# Patient Record
(demographics unavailable — no encounter records)

---

## 2024-10-25 NOTE — HISTORY OF PRESENT ILLNESS
[de-identified] : Ms. Fariba Meneses is 39 years old male with manubrium lesion here for hospital follow up  Patient with no reported PMHx, no daily medications, nonsmoker, no alcohol use, accompanied by wife/daughter, presents to [place] ED on 1/4/2024 for evaluation of worsening chest pain for past month with acute worsening over past few days.   CTA A/P No evidence of aortic dissection or aortic aneurysm. 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 Bilateral lower lobe linear and patchy opacities suspicious for infiltrates.  Numerous osteolytic and sclerotic bone lesions compatible with metastasis of uncertain origin. These includes numerous osteolytic sternal and manubrial lesions breaking through the cortex . Recommend PET/CT.  Splenomegaly. Numerous up to 3.4 cm low-density splenic lesions. Follow-up MRI without without contrast is recommended. 3 cm cyst in hepatic segment 7 and subcentimeter low-density liver lesions too small to characterize. Prostatomegaly. Correlation with rectal exam and PSA level is recommended. Mediastinal, retroperitoneal and mesenteric lymphadenopathy. Correlation is recommended to exclude neoplastic etiologies.  1/5/2024 CT Chest LUNGS/AIRWAYS/PLEURA: Small volume secretions in the trachea. No endobronchial lesion. Linear and dependent atelectasis in the lower lobes. Left upper lobe calcified granuloma. No pleural effusion. LYMPH NODES/MEDIASTINUM: Unchanged mildly enlarged mediastinal lymph nodes in the prevascular, periaortic, and aortopulmonary window spaces. Calcified lymph nodes compatible with prior granulomatous disease. HEART/VASCULATURE: Normal heart size. No pericardial effusion. Normal caliber aorta. UPPER ABDOMEN: Hepatic cyst. Multiple hypoattenuating lesions in an enlarged spleen. BONES/SOFT TISSUES: Unchanged multiple lytic and sclerotic bone lesions.  1/7/2024 MRI Abd SPLEEN: Enlarged spanning 14.5 cm. Multiple splenic lesions with nonspecific imaging features, mildly T1 hyperintense, hypoenhancement to normal splenic parenchyma, largest 3.3 x 3.1 cm VESSELS: Right main renal artery saccular aneurysm 1.2 cm (19:35) RETROPERITONEUM/LYMPH NODES: Increased number of small retroperitoneal lymph nodes, some are enlarged, for example: *  Celiac axis 2.4 x 1.5 cm (4:18) *  Aortic bifurcation 2.3 x 1.4 cm (4:3) ABDOMINAL WALL: Within normal limits. BONES: 2 marrow replacing lesions in T10 vertebral body and 1 marrow replacing lesion in L2 vertebral body. IMPRESSION:  Enlarged spleen with multiple indeterminate lesions. Differential possibilities include splenic lymphoma versus metastases including melanoma. Recommend further workup with biopsy or FDG PET/CT. Mild retroperitoneal lymphadenopathy.  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     TB work up was neg Hepatitis neg  Flow cytometry: peripheral blood, flow cytometric immunophenotyping: A small lambda light chain restricted B-cell population is detected that represents approximately 2% of the total analyzed events. The significance of this finding is uncertain. It does not necessarily indicate involvement by a lymphoproliferative disorder.  1/8/2024  WBC 3.00 H/H 10.5/32.7 MCV 80.5    s/p hospitalized at [facility] from 8/12/2024 - 8/15/2024 for pain, hypercalcemia, and fatigue. Given Zometa and biopsy of left inguinal LN positive for B-cell lymphoma in a diffuse pattern    [de-identified] : Patient is here for C4 [facility] (8/29/24 - present)   Nilsa # [id number]  He has left rib pain - 3 /10, started earlier this weekend and not needing any pain  meds No longer having any hip/thigh pain

## 2024-10-25 NOTE — HISTORY OF PRESENT ILLNESS
[de-identified] : Ms. Fariba Meneses is 39 years old male with manubrium lesion here for hospital follow up  Patient with no reported PMHx, no daily medications, nonsmoker, no alcohol use, accompanied by wife/daughter, presents to [place] ED on 1/4/2024 for evaluation of worsening chest pain for past month with acute worsening over past few days.   CTA A/P No evidence of aortic dissection or aortic aneurysm. 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 Bilateral lower lobe linear and patchy opacities suspicious for infiltrates.  Numerous osteolytic and sclerotic bone lesions compatible with metastasis of uncertain origin. These includes numerous osteolytic sternal and manubrial lesions breaking through the cortex . Recommend PET/CT.  Splenomegaly. Numerous up to 3.4 cm low-density splenic lesions. Follow-up MRI without without contrast is recommended. 3 cm cyst in hepatic segment 7 and subcentimeter low-density liver lesions too small to characterize. Prostatomegaly. Correlation with rectal exam and PSA level is recommended. Mediastinal, retroperitoneal and mesenteric lymphadenopathy. Correlation is recommended to exclude neoplastic etiologies.  1/5/2024 CT Chest LUNGS/AIRWAYS/PLEURA: Small volume secretions in the trachea. No endobronchial lesion. Linear and dependent atelectasis in the lower lobes. Left upper lobe calcified granuloma. No pleural effusion. LYMPH NODES/MEDIASTINUM: Unchanged mildly enlarged mediastinal lymph nodes in the prevascular, periaortic, and aortopulmonary window spaces. Calcified lymph nodes compatible with prior granulomatous disease. HEART/VASCULATURE: Normal heart size. No pericardial effusion. Normal caliber aorta. UPPER ABDOMEN: Hepatic cyst. Multiple hypoattenuating lesions in an enlarged spleen. BONES/SOFT TISSUES: Unchanged multiple lytic and sclerotic bone lesions.  1/7/2024 MRI Abd SPLEEN: Enlarged spanning 14.5 cm. Multiple splenic lesions with nonspecific imaging features, mildly T1 hyperintense, hypoenhancement to normal splenic parenchyma, largest 3.3 x 3.1 cm VESSELS: Right main renal artery saccular aneurysm 1.2 cm (19:35) RETROPERITONEUM/LYMPH NODES: Increased number of small retroperitoneal lymph nodes, some are enlarged, for example: *  Celiac axis 2.4 x 1.5 cm (4:18) *  Aortic bifurcation 2.3 x 1.4 cm (4:3) ABDOMINAL WALL: Within normal limits. BONES: 2 marrow replacing lesions in T10 vertebral body and 1 marrow replacing lesion in L2 vertebral body. IMPRESSION:  Enlarged spleen with multiple indeterminate lesions. Differential possibilities include splenic lymphoma versus metastases including melanoma. Recommend further workup with biopsy or FDG PET/CT. Mild retroperitoneal lymphadenopathy.  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     TB work up was neg Hepatitis neg  Flow cytometry: peripheral blood, flow cytometric immunophenotyping: A small lambda light chain restricted B-cell population is detected that represents approximately 2% of the total analyzed events. The significance of this finding is uncertain. It does not necessarily indicate involvement by a lymphoproliferative disorder.  1/8/2024  WBC 3.00 H/H 10.5/32.7 MCV 80.5    s/p hospitalized at [facility] from 8/12/2024 - 8/15/2024 for pain, hypercalcemia, and fatigue. Given Zometa and biopsy of left inguinal LN positive for B-cell lymphoma in a diffuse pattern    [de-identified] : Patient is here for C4 [facility] (8/29/24 - present)   Nilsa # [id number]  He has left rib pain - 3 /10, started earlier this weekend and not needing any pain  meds No longer having any hip/thigh pain

## 2024-10-25 NOTE — ASSESSMENT
[FreeTextEntry1] : ##DLBCL  ( dx - 8/2024) Patient with worsening left hip pain, finally had Pet/CT 7/31/2024 Pet/CT   1. Hypermetabolic lymphadenopathy above and below the diaphragm, numerous osseous mets, enlarged, heterogeneous spleen with numerous hypermetabolic lesions s/p Lopez for bone pain, hypercalcemia and fatigue - s/p Zometa 8/11/2024 and biopsy 8/12/2024 Left inguinal LN biopsy shows B cell lymphoma in a diffuse pattern  s/p mediport placement, TTE (EF 60-65%), and LP (neg)   Patient is here for C4 RCHOP with Neulasta (8/29/24 - present Left rib pain - closely to monitor  and to take Tylenol prn pain.  -Labs are drawn in the office, reviewed, analyzed, and discussed Re-iterated to take Prednisone 100 mg daily x 4 days post treatment.  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  to have Pet/CT prior next treatment. -proceed with treatment.   ## Follicular lymphoma (1/8/2024) Grade 1-2 Found on inguinal lymph node biopsy --CT A/P (1/4/24): Splenomegaly. Numerous up to 3.4 cm low-density splenic lesions. Numerous osteolytic and sclerotic bone lesions compatible with metastasis. Mediastinal, retroperitoneal and mesenteric lymphadenopathy --MRI abdomen (1/2024): Enlarged spleen with multiple indeterminate lesions. Differential possibilities include splenic lymphoma versus metastases including melanoma.  Mild retroperitoneal lymphadenopathy. Indeterminate marrow replacing lesions in T10 and L2 vertebral bodies. Hepatitis B, hepatitis C, HIV panels negative --3/13/2024 bone marrow -monotypic b cells (0.43% of total, 4.2% of lymphocytes) in a background of polytpic B cells -myeloblasts (0.44%) ABNORMAL FISH LYMPHOMA PANEL - IGH::BCL2 fusion detected (3%)  #Pain  Referral to palliative care team and rad onc for pain management.   Patient had multiple questions which were answered to satisfaction  d/w Dr. Hilton harrison in 3 weeks for C5 CBC, CMP, LDH,

## 2024-11-15 NOTE — ASSESSMENT
[FreeTextEntry1] : ##DLBCL  ( dx - 8/2024) Patient with worsening left hip pain, finally had Pet/CT 7/31/2024 Pet/CT   1. Hypermetabolic lymphadenopathy above and below the diaphragm, numerous osseous mets, enlarged, heterogeneous spleen with numerous hypermetabolic lesions s/p Lopez for bone pain, hypercalcemia and fatigue - s/p Zometa 8/11/2024 and biopsy 8/12/2024 Left inguinal LN biopsy shows B cell lymphoma in a diffuse pattern  s/p mediport placement, TTE (EF 60-65%), and LP (neg)   Patient is here for C5 RCHOP with Neulasta (8/29/24 - present). Hold due to URTI Symptoms as outlined in HPI Recommend levofloxacin 500 mg daily x 7 days.  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  Restaging PET shows CR  Patient ecstatic to hear the results. Complete 6 cycles of RCHOP followed by repeat scan   Uptake in the prostate Incidentally found on PET He denies any dysuria, polyuria, hematuria He is getting levofloxacin for URTI which should help  ## Follicular lymphoma (1/8/2024) Grade 1-2 Found on inguinal lymph node biopsy --CT A/P (1/4/24): Splenomegaly. Numerous up to 3.4 cm low-density splenic lesions. Numerous osteolytic and sclerotic bone lesions compatible with metastasis. Mediastinal, retroperitoneal and mesenteric lymphadenopathy --MRI abdomen (1/2024): Enlarged spleen with multiple indeterminate lesions. Differential possibilities include splenic lymphoma versus metastases including melanoma.  Mild retroperitoneal lymphadenopathy. Indeterminate marrow replacing lesions in T10 and L2 vertebral bodies. Hepatitis B, hepatitis C, HIV panels negative --3/13/2024 bone marrow -monotypic b cells (0.43% of total, 4.2% of lymphocytes) in a background of polytpic B cells -myeloblasts (0.44%) ABNORMAL FISH LYMPHOMA PANEL - IGH::BCL2 fusion detected (3%)  #Pain  Referral to palliative care team and rad onc for pain management.   Patient had multiple questions which were answered to satisfaction  rtc in 1-2 weeks for C5 CBC, CMP, LDH

## 2024-11-15 NOTE — HISTORY OF PRESENT ILLNESS
[de-identified] : Ms. Fariba Meneses is 39 years old male with manubrium lesion here for hospital follow up  Patient with no reported PMHx, no daily medications, nonsmoker, no alcohol use, accompanied by wife/daughter, presents to [place] ED on 1/4/2024 for evaluation of worsening chest pain for past month with acute worsening over past few days.   CTA A/P No evidence of aortic dissection or aortic aneurysm. 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 Bilateral lower lobe linear and patchy opacities suspicious for infiltrates.  Numerous osteolytic and sclerotic bone lesions compatible with metastasis of uncertain origin. These includes numerous osteolytic sternal and manubrial lesions breaking through the cortex . Recommend PET/CT.  Splenomegaly. Numerous up to 3.4 cm low-density splenic lesions. Follow-up MRI without without contrast is recommended. 3 cm cyst in hepatic segment 7 and subcentimeter low-density liver lesions too small to characterize. Prostatomegaly. Correlation with rectal exam and PSA level is recommended. Mediastinal, retroperitoneal and mesenteric lymphadenopathy. Correlation is recommended to exclude neoplastic etiologies.  1/5/2024 CT Chest LUNGS/AIRWAYS/PLEURA: Small volume secretions in the trachea. No endobronchial lesion. Linear and dependent atelectasis in the lower lobes. Left upper lobe calcified granuloma. No pleural effusion. LYMPH NODES/MEDIASTINUM: Unchanged mildly enlarged mediastinal lymph nodes in the prevascular, periaortic, and aortopulmonary window spaces. Calcified lymph nodes compatible with prior granulomatous disease. HEART/VASCULATURE: Normal heart size. No pericardial effusion. Normal caliber aorta. UPPER ABDOMEN: Hepatic cyst. Multiple hypoattenuating lesions in an enlarged spleen. BONES/SOFT TISSUES: Unchanged multiple lytic and sclerotic bone lesions.  1/7/2024 MRI Abd SPLEEN: Enlarged spanning 14.5 cm. Multiple splenic lesions with nonspecific imaging features, mildly T1 hyperintense, hypoenhancement to normal splenic parenchyma, largest 3.3 x 3.1 cm VESSELS: Right main renal artery saccular aneurysm 1.2 cm (19:35) RETROPERITONEUM/LYMPH NODES: Increased number of small retroperitoneal lymph nodes, some are enlarged, for example: *  Celiac axis 2.4 x 1.5 cm (4:18) *  Aortic bifurcation 2.3 x 1.4 cm (4:3) ABDOMINAL WALL: Within normal limits. BONES: 2 marrow replacing lesions in T10 vertebral body and 1 marrow replacing lesion in L2 vertebral body. IMPRESSION:  Enlarged spleen with multiple indeterminate lesions. Differential possibilities include splenic lymphoma versus metastases including melanoma. Recommend further workup with biopsy or FDG PET/CT. Mild retroperitoneal lymphadenopathy.  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     TB work up was neg Hepatitis neg  Flow cytometry: peripheral blood, flow cytometric immunophenotyping: A small lambda light chain restricted B-cell population is detected that represents approximately 2% of the total analyzed events. The significance of this finding is uncertain. It does not necessarily indicate involvement by a lymphoproliferative disorder.  1/8/2024  WBC 3.00 H/H 10.5/32.7 MCV 80.5    s/p hospitalized at [facility] from 8/12/2024 - 8/15/2024 for pain, hypercalcemia, and fatigue. Given Zometa and biopsy of left inguinal LN positive for B-cell lymphoma in a diffuse pattern    [de-identified] : Patient is here for C5 [facility] (8/29/24 - present). Hold chemo due to URTI  Pranav: [id number]  Patient today feels sick and has symptoms of an upper respiratory infection.  SX: cough with yellow expectoration Also has night sweats with fevers, chills.   PET SCAN 11/12/2024:   1. No definite evidence of FDG avid malignancy, particularly lymphoma, consistent with a complete or near complete response to treatment. Previously noted hypermetabolic lymphadenopathy is no longer identified. Mild uptake is noted at an old fracture deformity in the proximal left clavicle. Mild uptake in the remaining osseous lesions noted previously may be physiologic. Interval resolution of previously noted splenic lesions. Continued surveillance is recommended.  2. New multifocal uptake in the prostate, possibly representing prostatitis. Correlate clinically. Consider correlation with PSA level.  3. Sinusitis at the left maxillary sinus.

## 2024-11-15 NOTE — HISTORY OF PRESENT ILLNESS
[de-identified] : Ms. Fariba Meneses is 39 years old male with manubrium lesion here for hospital follow up  Patient with no reported PMHx, no daily medications, nonsmoker, no alcohol use, accompanied by wife/daughter, presents to [place] ED on 1/4/2024 for evaluation of worsening chest pain for past month with acute worsening over past few days.   CTA A/P No evidence of aortic dissection or aortic aneurysm. 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 Bilateral lower lobe linear and patchy opacities suspicious for infiltrates.  Numerous osteolytic and sclerotic bone lesions compatible with metastasis of uncertain origin. These includes numerous osteolytic sternal and manubrial lesions breaking through the cortex . Recommend PET/CT.  Splenomegaly. Numerous up to 3.4 cm low-density splenic lesions. Follow-up MRI without without contrast is recommended. 3 cm cyst in hepatic segment 7 and subcentimeter low-density liver lesions too small to characterize. Prostatomegaly. Correlation with rectal exam and PSA level is recommended. Mediastinal, retroperitoneal and mesenteric lymphadenopathy. Correlation is recommended to exclude neoplastic etiologies.  1/5/2024 CT Chest LUNGS/AIRWAYS/PLEURA: Small volume secretions in the trachea. No endobronchial lesion. Linear and dependent atelectasis in the lower lobes. Left upper lobe calcified granuloma. No pleural effusion. LYMPH NODES/MEDIASTINUM: Unchanged mildly enlarged mediastinal lymph nodes in the prevascular, periaortic, and aortopulmonary window spaces. Calcified lymph nodes compatible with prior granulomatous disease. HEART/VASCULATURE: Normal heart size. No pericardial effusion. Normal caliber aorta. UPPER ABDOMEN: Hepatic cyst. Multiple hypoattenuating lesions in an enlarged spleen. BONES/SOFT TISSUES: Unchanged multiple lytic and sclerotic bone lesions.  1/7/2024 MRI Abd SPLEEN: Enlarged spanning 14.5 cm. Multiple splenic lesions with nonspecific imaging features, mildly T1 hyperintense, hypoenhancement to normal splenic parenchyma, largest 3.3 x 3.1 cm VESSELS: Right main renal artery saccular aneurysm 1.2 cm (19:35) RETROPERITONEUM/LYMPH NODES: Increased number of small retroperitoneal lymph nodes, some are enlarged, for example: *  Celiac axis 2.4 x 1.5 cm (4:18) *  Aortic bifurcation 2.3 x 1.4 cm (4:3) ABDOMINAL WALL: Within normal limits. BONES: 2 marrow replacing lesions in T10 vertebral body and 1 marrow replacing lesion in L2 vertebral body. IMPRESSION:  Enlarged spleen with multiple indeterminate lesions. Differential possibilities include splenic lymphoma versus metastases including melanoma. Recommend further workup with biopsy or FDG PET/CT. Mild retroperitoneal lymphadenopathy.  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     TB work up was neg Hepatitis neg  Flow cytometry: peripheral blood, flow cytometric immunophenotyping: A small lambda light chain restricted B-cell population is detected that represents approximately 2% of the total analyzed events. The significance of this finding is uncertain. It does not necessarily indicate involvement by a lymphoproliferative disorder.  1/8/2024  WBC 3.00 H/H 10.5/32.7 MCV 80.5    s/p hospitalized at [facility] from 8/12/2024 - 8/15/2024 for pain, hypercalcemia, and fatigue. Given Zometa and biopsy of left inguinal LN positive for B-cell lymphoma in a diffuse pattern    [de-identified] : Patient is here for C5 [facility] (8/29/24 - present). Hold chemo due to URTI  Pranav: [phone]  Patient today feels sick and has symptoms of an upper respiratory infection.  SX: cough with yellow expectoration Also has night sweats with fevers, chills.   PET SCAN 11/12/2024:   1. No definite evidence of FDG avid malignancy, particularly lymphoma, consistent with a complete or near complete response to treatment. Previously noted hypermetabolic lymphadenopathy is no longer identified. Mild uptake is noted at an old fracture deformity in the proximal left clavicle. Mild uptake in the remaining osseous lesions noted previously may be physiologic. Interval resolution of previously noted splenic lesions. Continued surveillance is recommended.  2. New multifocal uptake in the prostate, possibly representing prostatitis. Correlate clinically. Consider correlation with PSA level.  3. Sinusitis at the left maxillary sinus.

## 2024-11-15 NOTE — HISTORY OF PRESENT ILLNESS
[de-identified] : Ms. Fariba Meneses is 39 years old male with manubrium lesion here for hospital follow up  Patient with no reported PMHx, no daily medications, nonsmoker, no alcohol use, accompanied by wife/daughter, presents to [place] ED on 1/4/2024 for evaluation of worsening chest pain for past month with acute worsening over past few days.   CTA A/P No evidence of aortic dissection or aortic aneurysm. 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 Bilateral lower lobe linear and patchy opacities suspicious for infiltrates.  Numerous osteolytic and sclerotic bone lesions compatible with metastasis of uncertain origin. These includes numerous osteolytic sternal and manubrial lesions breaking through the cortex . Recommend PET/CT.  Splenomegaly. Numerous up to 3.4 cm low-density splenic lesions. Follow-up MRI without without contrast is recommended. 3 cm cyst in hepatic segment 7 and subcentimeter low-density liver lesions too small to characterize. Prostatomegaly. Correlation with rectal exam and PSA level is recommended. Mediastinal, retroperitoneal and mesenteric lymphadenopathy. Correlation is recommended to exclude neoplastic etiologies.  1/5/2024 CT Chest LUNGS/AIRWAYS/PLEURA: Small volume secretions in the trachea. No endobronchial lesion. Linear and dependent atelectasis in the lower lobes. Left upper lobe calcified granuloma. No pleural effusion. LYMPH NODES/MEDIASTINUM: Unchanged mildly enlarged mediastinal lymph nodes in the prevascular, periaortic, and aortopulmonary window spaces. Calcified lymph nodes compatible with prior granulomatous disease. HEART/VASCULATURE: Normal heart size. No pericardial effusion. Normal caliber aorta. UPPER ABDOMEN: Hepatic cyst. Multiple hypoattenuating lesions in an enlarged spleen. BONES/SOFT TISSUES: Unchanged multiple lytic and sclerotic bone lesions.  1/7/2024 MRI Abd SPLEEN: Enlarged spanning 14.5 cm. Multiple splenic lesions with nonspecific imaging features, mildly T1 hyperintense, hypoenhancement to normal splenic parenchyma, largest 3.3 x 3.1 cm VESSELS: Right main renal artery saccular aneurysm 1.2 cm (19:35) RETROPERITONEUM/LYMPH NODES: Increased number of small retroperitoneal lymph nodes, some are enlarged, for example: *  Celiac axis 2.4 x 1.5 cm (4:18) *  Aortic bifurcation 2.3 x 1.4 cm (4:3) ABDOMINAL WALL: Within normal limits. BONES: 2 marrow replacing lesions in T10 vertebral body and 1 marrow replacing lesion in L2 vertebral body. IMPRESSION:  Enlarged spleen with multiple indeterminate lesions. Differential possibilities include splenic lymphoma versus metastases including melanoma. Recommend further workup with biopsy or FDG PET/CT. Mild retroperitoneal lymphadenopathy.  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     TB work up was neg Hepatitis neg  Flow cytometry: peripheral blood, flow cytometric immunophenotyping: A small lambda light chain restricted B-cell population is detected that represents approximately 2% of the total analyzed events. The significance of this finding is uncertain. It does not necessarily indicate involvement by a lymphoproliferative disorder.  1/8/2024  WBC 3.00 H/H 10.5/32.7 MCV 80.5    s/p hospitalized at [facility] from 8/12/2024 - 8/15/2024 for pain, hypercalcemia, and fatigue. Given Zometa and biopsy of left inguinal LN positive for B-cell lymphoma in a diffuse pattern    [de-identified] : Patient is here for C5 [facility] (8/29/24 - present). Hold chemo due to URTI  Pranav: [id number]  Patient today feels sick and has symptoms of an upper respiratory infection.  SX: cough with yellow expectoration Also has night sweats with fevers, chills.   PET SCAN 11/12/2024:   1. No definite evidence of FDG avid malignancy, particularly lymphoma, consistent with a complete or near complete response to treatment. Previously noted hypermetabolic lymphadenopathy is no longer identified. Mild uptake is noted at an old fracture deformity in the proximal left clavicle. Mild uptake in the remaining osseous lesions noted previously may be physiologic. Interval resolution of previously noted splenic lesions. Continued surveillance is recommended.  2. New multifocal uptake in the prostate, possibly representing prostatitis. Correlate clinically. Consider correlation with PSA level.  3. Sinusitis at the left maxillary sinus.

## 2024-11-22 NOTE — HISTORY OF PRESENT ILLNESS
[de-identified] : Ms. Fariba Meneses is 39 years old male with manubrium lesion here for hospital follow up  Patient with no reported PMHx, no daily medications, nonsmoker, no alcohol use, accompanied by wife/daughter, presents to [place] ED on 1/4/2024 for evaluation of worsening chest pain for past month with acute worsening over past few days.   CTA A/P No evidence of aortic dissection or aortic aneurysm. 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 Bilateral lower lobe linear and patchy opacities suspicious for infiltrates.  Numerous osteolytic and sclerotic bone lesions compatible with metastasis of uncertain origin. These includes numerous osteolytic sternal and manubrial lesions breaking through the cortex . Recommend PET/CT.  Splenomegaly. Numerous up to 3.4 cm low-density splenic lesions. Follow-up MRI without without contrast is recommended. 3 cm cyst in hepatic segment 7 and subcentimeter low-density liver lesions too small to characterize. Prostatomegaly. Correlation with rectal exam and PSA level is recommended. Mediastinal, retroperitoneal and mesenteric lymphadenopathy. Correlation is recommended to exclude neoplastic etiologies.  1/5/2024 CT Chest LUNGS/AIRWAYS/PLEURA: Small volume secretions in the trachea. No endobronchial lesion. Linear and dependent atelectasis in the lower lobes. Left upper lobe calcified granuloma. No pleural effusion. LYMPH NODES/MEDIASTINUM: Unchanged mildly enlarged mediastinal lymph nodes in the prevascular, periaortic, and aortopulmonary window spaces. Calcified lymph nodes compatible with prior granulomatous disease. HEART/VASCULATURE: Normal heart size. No pericardial effusion. Normal caliber aorta. UPPER ABDOMEN: Hepatic cyst. Multiple hypoattenuating lesions in an enlarged spleen. BONES/SOFT TISSUES: Unchanged multiple lytic and sclerotic bone lesions.  1/7/2024 MRI Abd SPLEEN: Enlarged spanning 14.5 cm. Multiple splenic lesions with nonspecific imaging features, mildly T1 hyperintense, hypoenhancement to normal splenic parenchyma, largest 3.3 x 3.1 cm VESSELS: Right main renal artery saccular aneurysm 1.2 cm (19:35) RETROPERITONEUM/LYMPH NODES: Increased number of small retroperitoneal lymph nodes, some are enlarged, for example: *  Celiac axis 2.4 x 1.5 cm (4:18) *  Aortic bifurcation 2.3 x 1.4 cm (4:3) ABDOMINAL WALL: Within normal limits. BONES: 2 marrow replacing lesions in T10 vertebral body and 1 marrow replacing lesion in L2 vertebral body. IMPRESSION:  Enlarged spleen with multiple indeterminate lesions. Differential possibilities include splenic lymphoma versus metastases including melanoma. Recommend further workup with biopsy or FDG PET/CT. Mild retroperitoneal lymphadenopathy.  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     TB work up was neg Hepatitis neg  Flow cytometry: peripheral blood, flow cytometric immunophenotyping: A small lambda light chain restricted B-cell population is detected that represents approximately 2% of the total analyzed events. The significance of this finding is uncertain. It does not necessarily indicate involvement by a lymphoproliferative disorder.  1/8/2024  WBC 3.00 H/H 10.5/32.7 MCV 80.5    s/p hospitalized at [facility] from 8/12/2024 - 8/15/2024 for pain, hypercalcemia, and fatigue. Given Zometa and biopsy of left inguinal LN positive for B-cell lymphoma in a diffuse pattern    [de-identified] : Patient is here for [address] (8/29/24 - present) - treatment were delayed for 2 weeks due to URI symptoms.   Felton # [id number]  He's doing better, URIs symptoms resolved Denies of fever or pain.

## 2024-11-22 NOTE — ASSESSMENT
[FreeTextEntry1] : ##DLBCL  ( dx - 8/2024) Patient with worsening left hip pain, finally had Pet/CT 7/31/2024 Pet/CT   1. Hypermetabolic lymphadenopathy above and below the diaphragm, numerous osseous mets, enlarged, heterogeneous spleen with numerous hypermetabolic lesions s/p Lopez for bone pain, hypercalcemia and fatigue - s/p Zometa 8/11/2024 and biopsy 8/12/2024 Left inguinal LN biopsy shows B cell lymphoma in a diffuse pattern  s/p mediport placement, TTE (EF 60-65%), and LP (neg)   Patient is here for C5 RCHOP with Neulasta (8/29/24 - present) 11/12/2024 Pet/CT - CR  overall doing well and has no complaints -Labs are drawn in the office, reviewed, analyzed, and discussed to continue with treatment and repeat scan after completion of cycle 6  Uptake in the prostate Incidentally found on PET He denies any dysuria, polyuria, hematuria  ## Follicular lymphoma (1/8/2024) Grade 1-2 Found on inguinal lymph node biopsy --CT A/P (1/4/24): Splenomegaly. Numerous up to 3.4 cm low-density splenic lesions. Numerous osteolytic and sclerotic bone lesions compatible with metastasis. Mediastinal, retroperitoneal and mesenteric lymphadenopathy --MRI abdomen (1/2024): Enlarged spleen with multiple indeterminate lesions. Differential possibilities include splenic lymphoma versus metastases including melanoma.  Mild retroperitoneal lymphadenopathy. Indeterminate marrow replacing lesions in T10 and L2 vertebral bodies. Hepatitis B, hepatitis C, HIV panels negative --3/13/2024 bone marrow -monotypic b cells (0.43% of total, 4.2% of lymphocytes) in a background of polytpic B cells -myeloblasts (0.44%) ABNORMAL FISH LYMPHOMA PANEL - IGH::BCL2 fusion detected (3%)  #Pain  Referral to palliative care team and rad onc for pain management.   Patient had multiple questions which were answered to satisfaction  d/w Dr. Hilton harrison in 3 weeks for C6 CBC, CMP, LDH

## 2024-11-22 NOTE — HISTORY OF PRESENT ILLNESS
[de-identified] : Ms. Fariba Meneses is 39 years old male with manubrium lesion here for hospital follow up  Patient with no reported PMHx, no daily medications, nonsmoker, no alcohol use, accompanied by wife/daughter, presents to [place] ED on 1/4/2024 for evaluation of worsening chest pain for past month with acute worsening over past few days.   CTA A/P No evidence of aortic dissection or aortic aneurysm. 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 Bilateral lower lobe linear and patchy opacities suspicious for infiltrates.  Numerous osteolytic and sclerotic bone lesions compatible with metastasis of uncertain origin. These includes numerous osteolytic sternal and manubrial lesions breaking through the cortex . Recommend PET/CT.  Splenomegaly. Numerous up to 3.4 cm low-density splenic lesions. Follow-up MRI without without contrast is recommended. 3 cm cyst in hepatic segment 7 and subcentimeter low-density liver lesions too small to characterize. Prostatomegaly. Correlation with rectal exam and PSA level is recommended. Mediastinal, retroperitoneal and mesenteric lymphadenopathy. Correlation is recommended to exclude neoplastic etiologies.  1/5/2024 CT Chest LUNGS/AIRWAYS/PLEURA: Small volume secretions in the trachea. No endobronchial lesion. Linear and dependent atelectasis in the lower lobes. Left upper lobe calcified granuloma. No pleural effusion. LYMPH NODES/MEDIASTINUM: Unchanged mildly enlarged mediastinal lymph nodes in the prevascular, periaortic, and aortopulmonary window spaces. Calcified lymph nodes compatible with prior granulomatous disease. HEART/VASCULATURE: Normal heart size. No pericardial effusion. Normal caliber aorta. UPPER ABDOMEN: Hepatic cyst. Multiple hypoattenuating lesions in an enlarged spleen. BONES/SOFT TISSUES: Unchanged multiple lytic and sclerotic bone lesions.  1/7/2024 MRI Abd SPLEEN: Enlarged spanning 14.5 cm. Multiple splenic lesions with nonspecific imaging features, mildly T1 hyperintense, hypoenhancement to normal splenic parenchyma, largest 3.3 x 3.1 cm VESSELS: Right main renal artery saccular aneurysm 1.2 cm (19:35) RETROPERITONEUM/LYMPH NODES: Increased number of small retroperitoneal lymph nodes, some are enlarged, for example: *  Celiac axis 2.4 x 1.5 cm (4:18) *  Aortic bifurcation 2.3 x 1.4 cm (4:3) ABDOMINAL WALL: Within normal limits. BONES: 2 marrow replacing lesions in T10 vertebral body and 1 marrow replacing lesion in L2 vertebral body. IMPRESSION:  Enlarged spleen with multiple indeterminate lesions. Differential possibilities include splenic lymphoma versus metastases including melanoma. Recommend further workup with biopsy or FDG PET/CT. Mild retroperitoneal lymphadenopathy.  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     TB work up was neg Hepatitis neg  Flow cytometry: peripheral blood, flow cytometric immunophenotyping: A small lambda light chain restricted B-cell population is detected that represents approximately 2% of the total analyzed events. The significance of this finding is uncertain. It does not necessarily indicate involvement by a lymphoproliferative disorder.  1/8/2024  WBC 3.00 H/H 10.5/32.7 MCV 80.5    s/p hospitalized at [facility] from 8/12/2024 - 8/15/2024 for pain, hypercalcemia, and fatigue. Given Zometa and biopsy of left inguinal LN positive for B-cell lymphoma in a diffuse pattern    [de-identified] : Patient is here for [address] (8/29/24 - present) - treatment were delayed for 2 weeks due to URI symptoms.   Felton # [id number]  He's doing better, URIs symptoms resolved Denies of fever or pain.

## 2024-12-13 NOTE — HISTORY OF PRESENT ILLNESS
[de-identified] : Ms. Fariba Meneses is 39 years old male with manubrium lesion here for hospital follow up  Patient with no reported PMHx, no daily medications, nonsmoker, no alcohol use, accompanied by wife/daughter, presents to [place] ED on 1/4/2024 for evaluation of worsening chest pain for past month with acute worsening over past few days.   CTA A/P No evidence of aortic dissection or aortic aneurysm. 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 Bilateral lower lobe linear and patchy opacities suspicious for infiltrates.  Numerous osteolytic and sclerotic bone lesions compatible with metastasis of uncertain origin. These includes numerous osteolytic sternal and manubrial lesions breaking through the cortex . Recommend PET/CT.  Splenomegaly. Numerous up to 3.4 cm low-density splenic lesions. Follow-up MRI without without contrast is recommended. 3 cm cyst in hepatic segment 7 and subcentimeter low-density liver lesions too small to characterize. Prostatomegaly. Correlation with rectal exam and PSA level is recommended. Mediastinal, retroperitoneal and mesenteric lymphadenopathy. Correlation is recommended to exclude neoplastic etiologies.  1/5/2024 CT Chest LUNGS/AIRWAYS/PLEURA: Small volume secretions in the trachea. No endobronchial lesion. Linear and dependent atelectasis in the lower lobes. Left upper lobe calcified granuloma. No pleural effusion. LYMPH NODES/MEDIASTINUM: Unchanged mildly enlarged mediastinal lymph nodes in the prevascular, periaortic, and aortopulmonary window spaces. Calcified lymph nodes compatible with prior granulomatous disease. HEART/VASCULATURE: Normal heart size. No pericardial effusion. Normal caliber aorta. UPPER ABDOMEN: Hepatic cyst. Multiple hypoattenuating lesions in an enlarged spleen. BONES/SOFT TISSUES: Unchanged multiple lytic and sclerotic bone lesions.  1/7/2024 MRI Abd SPLEEN: Enlarged spanning 14.5 cm. Multiple splenic lesions with nonspecific imaging features, mildly T1 hyperintense, hypoenhancement to normal splenic parenchyma, largest 3.3 x 3.1 cm VESSELS: Right main renal artery saccular aneurysm 1.2 cm (19:35) RETROPERITONEUM/LYMPH NODES: Increased number of small retroperitoneal lymph nodes, some are enlarged, for example: *  Celiac axis 2.4 x 1.5 cm (4:18) *  Aortic bifurcation 2.3 x 1.4 cm (4:3) ABDOMINAL WALL: Within normal limits. BONES: 2 marrow replacing lesions in T10 vertebral body and 1 marrow replacing lesion in L2 vertebral body. IMPRESSION:  Enlarged spleen with multiple indeterminate lesions. Differential possibilities include splenic lymphoma versus metastases including melanoma. Recommend further workup with biopsy or FDG PET/CT. Mild retroperitoneal lymphadenopathy.  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     TB work up was neg Hepatitis neg  Flow cytometry: peripheral blood, flow cytometric immunophenotyping: A small lambda light chain restricted B-cell population is detected that represents approximately 2% of the total analyzed events. The significance of this finding is uncertain. It does not necessarily indicate involvement by a lymphoproliferative disorder.  1/8/2024  WBC 3.00 H/H 10.5/32.7 MCV 80.5    s/p hospitalized at [facility] from 8/12/2024 - 8/15/2024 for pain, hypercalcemia, and fatigue. Given Zometa and biopsy of left inguinal LN positive for B-cell lymphoma in a diffuse pattern    [de-identified] : Patient is here for C6 RCHOP (8/29/24 - present) - treatment were delayed for 2 weeks due to URI symptoms.   Minerva # [id number]  He had mild headaches and SOB  for a week after each treatment and resolved spontaneously. Currently asymptomatic.

## 2024-12-13 NOTE — ASSESSMENT
[FreeTextEntry1] : ##DLBCL  ( dx - 8/2024) Patient with worsening left hip pain, finally had Pet/CT 7/31/2024 Pet/CT   1. Hypermetabolic lymphadenopathy above and below the diaphragm, numerous osseous mets, enlarged, heterogeneous spleen with numerous hypermetabolic lesions s/p Lopez for bone pain, hypercalcemia and fatigue - s/p Zometa 8/11/2024 and biopsy 8/12/2024 Left inguinal LN biopsy shows B cell lymphoma in a diffuse pattern  s/p mediport placement, TTE (EF 60-65%), and LP (neg)   Patient is here for C6 RCHOP with Neulasta (8/29/24 - present) 11/12/2024 Pet/CT - CR  To continue with Prednisone 100 mg daily x 4 days with treatment  Reassured patient post chemo headaches 2/2 from steroids - to take Tylenol prn.  -Labs are drawn in the office, reviewed, analyzed, and discussed to take Zofran prn nausea to continue with treatment and repeat PET/CT  scan in 3-4 weeks  Uptake in the prostate Incidentally found on PET He denies any dysuria, polyuria, hematuria  ## Follicular lymphoma (1/8/2024) Grade 1-2 Found on inguinal lymph node biopsy --CT A/P (1/4/24): Splenomegaly. Numerous up to 3.4 cm low-density splenic lesions. Numerous osteolytic and sclerotic bone lesions compatible with metastasis. Mediastinal, retroperitoneal and mesenteric lymphadenopathy --MRI abdomen (1/2024): Enlarged spleen with multiple indeterminate lesions. Differential possibilities include splenic lymphoma versus metastases including melanoma.  Mild retroperitoneal lymphadenopathy. Indeterminate marrow replacing lesions in T10 and L2 vertebral bodies. Hepatitis B, hepatitis C, HIV panels negative --3/13/2024 bone marrow -monotypic b cells (0.43% of total, 4.2% of lymphocytes) in a background of polytpic B cells -myeloblasts (0.44%) ABNORMAL FISH LYMPHOMA PANEL - IGH::BCL2 fusion detected (3%)  #Pain  Referral to palliative care team and rad onc for pain management.   Patient had multiple questions which were answered to satisfaction  d/w Dr. Canela  rtc in 4-5 weeks for follow up with Pet/CT scan  CBC, CMP, LDH

## 2025-01-03 NOTE — ASSESSMENT
[FreeTextEntry1] : ##DLBCL  ( dx - 8/2024) Patient with worsening left hip pain, finally had Pet/CT 7/31/2024 Pet/CT   1. Hypermetabolic lymphadenopathy above and below the diaphragm, numerous osseous mets, enlarged, heterogeneous spleen with numerous hypermetabolic lesions s/p Lopez for bone pain, hypercalcemia and fatigue - s/p Zometa 8/11/2024 and biopsy 8/12/2024 Left inguinal LN biopsy shows B cell lymphoma in a diffuse pattern  s/p mediport placement, TTE (EF 60-65%), and LP (neg)  11/12/2024 Pet/CT - CR  s/p C6 RCHOP (8/29/24 - 12/13/24)   Patient is seen today for follow up Overall feels well Restaging PET/CT shows CR.  Labs ordered, drawn in the office, reviewed, analyzed and discussed Chemo induced agranulocytosis: monitor for now. No indication for transfusion. MOnitor for fevers. Precuations during the virus season discussed Follow up in 3 months and restaging scan in 6 months  Uptake in the prostate Incidentally found on PET He denies any dysuria, polyuria, hematuria  ## Follicular lymphoma (1/8/2024) Grade 1-2 Found on inguinal lymph node biopsy --CT A/P (1/4/24): Splenomegaly. Numerous up to 3.4 cm low-density splenic lesions. Numerous osteolytic and sclerotic bone lesions compatible with metastasis. Mediastinal, retroperitoneal and mesenteric lymphadenopathy --MRI abdomen (1/2024): Enlarged spleen with multiple indeterminate lesions. Differential possibilities include splenic lymphoma versus metastases including melanoma.  Mild retroperitoneal lymphadenopathy. Indeterminate marrow replacing lesions in T10 and L2 vertebral bodies. Hepatitis B, hepatitis C, HIV panels negative --3/13/2024 bone marrow -monotypic b cells (0.43% of total, 4.2% of lymphocytes) in a background of polytpic B cells -myeloblasts (0.44%) ABNORMAL FISH LYMPHOMA PANEL - IGH::BCL2 fusion detected (3%)  #Pain  Referral to palliative care team and rad onc for pain management.   Patient had multiple questions which were answered to satisfaction  Follow up in 3 months CBC, CMP, LDH

## 2025-01-03 NOTE — BEGINNING OF VISIT
[0] : 2) Feeling down, depressed, or hopeless: Not at all (0) [PHQ-2 Negative] : PHQ-2 Negative [Pain Scale: ___] : On a scale of 1-10, today the patient's pain is a(n) [unfilled]. [Never] : Never [Date Discussed (MM/DD/YY): ___] : Discussed: [unfilled] [Reviewed, no changes] : Reviewed, no changes [[id number]] : 0

## 2025-01-03 NOTE — HISTORY OF PRESENT ILLNESS
[de-identified] : Ms. Fariba Meneses is 39 years old male with manubrium lesion here for hospital follow up  Patient with no reported PMHx, no daily medications, nonsmoker, no alcohol use, accompanied by wife/daughter, presents to [facility] ED on 1/4/2024 for evaluation of worsening chest pain for past month with acute worsening over past few days.   CTA A/P No evidence of aortic dissection or aortic aneurysm. 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 Bilateral lower lobe linear and patchy opacities suspicious for infiltrates.  Numerous osteolytic and sclerotic bone lesions compatible with metastasis of uncertain origin. These includes numerous osteolytic sternal and manubrial lesions breaking through the cortex . Recommend PET/CT.  Splenomegaly. Numerous up to 3.4 cm low-density splenic lesions. Follow-up MRI without without contrast is recommended. 3 cm cyst in hepatic segment 7 and subcentimeter low-density liver lesions too small to characterize. Prostatomegaly. Correlation with rectal exam and PSA level is recommended. Mediastinal, retroperitoneal and mesenteric lymphadenopathy. Correlation is recommended to exclude neoplastic etiologies.  1/5/2024 CT Chest LUNGS/AIRWAYS/PLEURA: Small volume secretions in the trachea. No endobronchial lesion. Linear and dependent atelectasis in the lower lobes. Left upper lobe calcified granuloma. No pleural effusion. LYMPH NODES/MEDIASTINUM: Unchanged mildly enlarged mediastinal lymph nodes in the prevascular, periaortic, and aortopulmonary window spaces. Calcified lymph nodes compatible with prior granulomatous disease. HEART/VASCULATURE: Normal heart size. No pericardial effusion. Normal caliber aorta. UPPER ABDOMEN: Hepatic cyst. Multiple hypoattenuating lesions in an enlarged spleen. BONES/SOFT TISSUES: Unchanged multiple lytic and sclerotic bone lesions.  1/7/2024 MRI Abd SPLEEN: Enlarged spanning 14.5 cm. Multiple splenic lesions with nonspecific imaging features, mildly T1 hyperintense, hypoenhancement to normal splenic parenchyma, largest 3.3 x 3.1 cm VESSELS: Right main renal artery saccular aneurysm 1.2 cm (19:35) RETROPERITONEUM/LYMPH NODES: Increased number of small retroperitoneal lymph nodes, some are enlarged, for example: *  Celiac axis 2.4 x 1.5 cm (4:18) *  Aortic bifurcation 2.3 x 1.4 cm (4:3) ABDOMINAL WALL: Within normal limits. BONES: 2 marrow replacing lesions in T10 vertebral body and 1 marrow replacing lesion in L2 vertebral body. IMPRESSION:  Enlarged spleen with multiple indeterminate lesions. Differential possibilities include splenic lymphoma versus metastases including melanoma. Recommend further workup with biopsy or FDG PET/CT. Mild retroperitoneal lymphadenopathy.  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     TB work up was neg Hepatitis neg  Flow cytometry: peripheral blood, flow cytometric immunophenotyping: A small lambda light chain restricted B-cell population is detected that represents approximately 2% of the total analyzed events. The significance of this finding is uncertain. It does not necessarily indicate involvement by a lymphoproliferative disorder.  1/8/2024  WBC 3.00 H/H 10.5/32.7 MCV 80.5    s/p hospitalized at [facility] from 8/12/2024 - 8/15/2024 for pain, hypercalcemia, and fatigue. Given Zometa and biopsy of left inguinal LN positive for B-cell lymphoma in a diffuse pattern    [de-identified] : Patient is here for follow up s/p C6 RCHOP (8/29/24 - 12/13/24)   Gold: [id number]   Patient states overall feels well with no new issues except for problems with sleeping at night, minor headache.  He had a PET scan on 12/2024:   1.  There has been virtually complete resolution of all of the abnormal activity seen on the patient's initial study.  2.  Focal activity noted in the prostate on the most recent PET scan from 11/12/2024 is no longer present.  Some irregular activity remains with only minimal FDG uptake.  3.  Nonspecific activity is seen in the salivary glands and symmetric activity is seen in the testicles.  There is also diffusely increased bone marrow activity.  These findings were not present on the baseline study but were present on the prior PET scan and are probably related to chemotherapy.

## 2025-01-03 NOTE — REVIEW OF SYSTEMS
[Insomnia] : insomnia [FreeTextEntry2] : 10 point review of systems negative except as outlined in HPI [de-identified] : slight

## 2025-01-03 NOTE — REASON FOR VISIT
[Initial Consultation] : an initial consultation [Follow-Up Visit] : a follow-up [FreeTextEntry2] : manubrium lesion

## 2025-01-03 NOTE — BEGINNING OF VISIT
[0] : 2) Feeling down, depressed, or hopeless: Not at all (0) [PHQ-2 Negative] : PHQ-2 Negative [Pain Scale: ___] : On a scale of 1-10, today the patient's pain is a(n) [unfilled]. [Never] : Never [Date Discussed (MM/DD/YY): ___] : Discussed: [unfilled] [Reviewed, no changes] : Reviewed, no changes [[id number]] : 0

## 2025-01-03 NOTE — BEGINNING OF VISIT
[0] : 2) Feeling down, depressed, or hopeless: Not at all (0) [PHQ-2 Negative] : PHQ-2 Negative [Pain Scale: ___] : On a scale of 1-10, today the patient's pain is a(n) [unfilled]. [Never] : Never [Date Discussed (MM/DD/YY): ___] : Discussed: [unfilled] [Reviewed, no changes] : Reviewed, no changes [[id number]] : 0

## 2025-01-03 NOTE — REVIEW OF SYSTEMS
[Insomnia] : insomnia [FreeTextEntry2] : 10 point review of systems negative except as outlined in HPI [de-identified] : slight

## 2025-01-03 NOTE — HISTORY OF PRESENT ILLNESS
[de-identified] : Ms. Fariba Meneses is 39 years old male with manubrium lesion here for hospital follow up  Patient with no reported PMHx, no daily medications, nonsmoker, no alcohol use, accompanied by wife/daughter, presents to [place] ED on 1/4/2024 for evaluation of worsening chest pain for past month with acute worsening over past few days.   CTA A/P No evidence of aortic dissection or aortic aneurysm. 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 Bilateral lower lobe linear and patchy opacities suspicious for infiltrates.  Numerous osteolytic and sclerotic bone lesions compatible with metastasis of uncertain origin. These includes numerous osteolytic sternal and manubrial lesions breaking through the cortex . Recommend PET/CT.  Splenomegaly. Numerous up to 3.4 cm low-density splenic lesions. Follow-up MRI without without contrast is recommended. 3 cm cyst in hepatic segment 7 and subcentimeter low-density liver lesions too small to characterize. Prostatomegaly. Correlation with rectal exam and PSA level is recommended. Mediastinal, retroperitoneal and mesenteric lymphadenopathy. Correlation is recommended to exclude neoplastic etiologies.  1/5/2024 CT Chest LUNGS/AIRWAYS/PLEURA: Small volume secretions in the trachea. No endobronchial lesion. Linear and dependent atelectasis in the lower lobes. Left upper lobe calcified granuloma. No pleural effusion. LYMPH NODES/MEDIASTINUM: Unchanged mildly enlarged mediastinal lymph nodes in the prevascular, periaortic, and aortopulmonary window spaces. Calcified lymph nodes compatible with prior granulomatous disease. HEART/VASCULATURE: Normal heart size. No pericardial effusion. Normal caliber aorta. UPPER ABDOMEN: Hepatic cyst. Multiple hypoattenuating lesions in an enlarged spleen. BONES/SOFT TISSUES: Unchanged multiple lytic and sclerotic bone lesions.  1/7/2024 MRI Abd SPLEEN: Enlarged spanning 14.5 cm. Multiple splenic lesions with nonspecific imaging features, mildly T1 hyperintense, hypoenhancement to normal splenic parenchyma, largest 3.3 x 3.1 cm VESSELS: Right main renal artery saccular aneurysm 1.2 cm (19:35) RETROPERITONEUM/LYMPH NODES: Increased number of small retroperitoneal lymph nodes, some are enlarged, for example: *  Celiac axis 2.4 x 1.5 cm (4:18) *  Aortic bifurcation 2.3 x 1.4 cm (4:3) ABDOMINAL WALL: Within normal limits. BONES: 2 marrow replacing lesions in T10 vertebral body and 1 marrow replacing lesion in L2 vertebral body. IMPRESSION:  Enlarged spleen with multiple indeterminate lesions. Differential possibilities include splenic lymphoma versus metastases including melanoma. Recommend further workup with biopsy or FDG PET/CT. Mild retroperitoneal lymphadenopathy.  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     TB work up was neg Hepatitis neg  Flow cytometry: peripheral blood, flow cytometric immunophenotyping: A small lambda light chain restricted B-cell population is detected that represents approximately 2% of the total analyzed events. The significance of this finding is uncertain. It does not necessarily indicate involvement by a lymphoproliferative disorder.  1/8/2024  WBC 3.00 H/H 10.5/32.7 MCV 80.5    s/p hospitalized at [facility] [place] from 8/12/2024 - 8/15/2024 for pain, hypercalcemia, and fatigue. Given Zometa and biopsy of left inguinal LN positive for B-cell lymphoma in a diffuse pattern    [de-identified] : Patient is here for follow up s/p C6 RCHOP (8/29/24 - 12/13/24)   Gold: [id number]   Patient states overall feels well with no new issues except for problems with sleeping at night, minor headache.  He had a PET scan on 12/2024:   1.  There has been virtually complete resolution of all of the abnormal activity seen on the patient's initial study.  2.  Focal activity noted in the prostate on the most recent PET scan from 11/12/2024 is no longer present.  Some irregular activity remains with only minimal FDG uptake.  3.  Nonspecific activity is seen in the salivary glands and symmetric activity is seen in the testicles.  There is also diffusely increased bone marrow activity.  These findings were not present on the baseline study but were present on the prior PET scan and are probably related to chemotherapy.

## 2025-01-03 NOTE — HISTORY OF PRESENT ILLNESS
[de-identified] : Ms. Fariba Meneses is 39 years old male with manubrium lesion here for hospital follow up  Patient with no reported PMHx, no daily medications, nonsmoker, no alcohol use, accompanied by wife/daughter, presents to [facility] ED on 1/4/2024 for evaluation of worsening chest pain for past month with acute worsening over past few days.   CTA A/P No evidence of aortic dissection or aortic aneurysm. 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 Bilateral lower lobe linear and patchy opacities suspicious for infiltrates.  Numerous osteolytic and sclerotic bone lesions compatible with metastasis of uncertain origin. These includes numerous osteolytic sternal and manubrial lesions breaking through the cortex . Recommend PET/CT.  Splenomegaly. Numerous up to 3.4 cm low-density splenic lesions. Follow-up MRI without without contrast is recommended. 3 cm cyst in hepatic segment 7 and subcentimeter low-density liver lesions too small to characterize. Prostatomegaly. Correlation with rectal exam and PSA level is recommended. Mediastinal, retroperitoneal and mesenteric lymphadenopathy. Correlation is recommended to exclude neoplastic etiologies.  1/5/2024 CT Chest LUNGS/AIRWAYS/PLEURA: Small volume secretions in the trachea. No endobronchial lesion. Linear and dependent atelectasis in the lower lobes. Left upper lobe calcified granuloma. No pleural effusion. LYMPH NODES/MEDIASTINUM: Unchanged mildly enlarged mediastinal lymph nodes in the prevascular, periaortic, and aortopulmonary window spaces. Calcified lymph nodes compatible with prior granulomatous disease. HEART/VASCULATURE: Normal heart size. No pericardial effusion. Normal caliber aorta. UPPER ABDOMEN: Hepatic cyst. Multiple hypoattenuating lesions in an enlarged spleen. BONES/SOFT TISSUES: Unchanged multiple lytic and sclerotic bone lesions.  1/7/2024 MRI Abd SPLEEN: Enlarged spanning 14.5 cm. Multiple splenic lesions with nonspecific imaging features, mildly T1 hyperintense, hypoenhancement to normal splenic parenchyma, largest 3.3 x 3.1 cm VESSELS: Right main renal artery saccular aneurysm 1.2 cm (19:35) RETROPERITONEUM/LYMPH NODES: Increased number of small retroperitoneal lymph nodes, some are enlarged, for example: *  Celiac axis 2.4 x 1.5 cm (4:18) *  Aortic bifurcation 2.3 x 1.4 cm (4:3) ABDOMINAL WALL: Within normal limits. BONES: 2 marrow replacing lesions in T10 vertebral body and 1 marrow replacing lesion in L2 vertebral body. IMPRESSION:  Enlarged spleen with multiple indeterminate lesions. Differential possibilities include splenic lymphoma versus metastases including melanoma. Recommend further workup with biopsy or FDG PET/CT. Mild retroperitoneal lymphadenopathy.  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     TB work up was neg Hepatitis neg  Flow cytometry: peripheral blood, flow cytometric immunophenotyping: A small lambda light chain restricted B-cell population is detected that represents approximately 2% of the total analyzed events. The significance of this finding is uncertain. It does not necessarily indicate involvement by a lymphoproliferative disorder.  1/8/2024  WBC 3.00 H/H 10.5/32.7 MCV 80.5    s/p hospitalized at [facility] from 8/12/2024 - 8/15/2024 for pain, hypercalcemia, and fatigue. Given Zometa and biopsy of left inguinal LN positive for B-cell lymphoma in a diffuse pattern    [de-identified] : Patient is here for follow up s/p C6 RCHOP (8/29/24 - 12/13/24)   Gold: [id number]   Patient states overall feels well with no new issues except for problems with sleeping at night, minor headache.  He had a PET scan on 12/2024:   1.  There has been virtually complete resolution of all of the abnormal activity seen on the patient's initial study.  2.  Focal activity noted in the prostate on the most recent PET scan from 11/12/2024 is no longer present.  Some irregular activity remains with only minimal FDG uptake.  3.  Nonspecific activity is seen in the salivary glands and symmetric activity is seen in the testicles.  There is also diffusely increased bone marrow activity.  These findings were not present on the baseline study but were present on the prior PET scan and are probably related to chemotherapy.

## 2025-01-03 NOTE — REVIEW OF SYSTEMS
[Insomnia] : insomnia [FreeTextEntry2] : 10 point review of systems negative except as outlined in HPI [de-identified] : slight

## 2025-01-24 NOTE — REVIEW OF SYSTEMS
[Insomnia] : insomnia [FreeTextEntry2] : 10 point review of systems negative except as outlined in HPI [de-identified] : slight

## 2025-01-24 NOTE — ASSESSMENT
[FreeTextEntry1] : ##DLBCL  ( dx - 8/2024) Patient with worsening left hip pain, finally had Pet/CT 7/31/2024 Pet/CT   1. Hypermetabolic lymphadenopathy above and below the diaphragm, numerous osseous mets, enlarged, heterogeneous spleen with numerous hypermetabolic lesions s/p Lopez for bone pain, hypercalcemia and fatigue - s/p Zometa 8/11/2024 and biopsy 8/12/2024 Left inguinal LN biopsy shows B cell lymphoma in a diffuse pattern  s/p mediport placement, TTE (EF 60-65%), and LP (neg)  11/12/2024 Pet/CT - CR  s/p C6 RCHOP (8/29/24 - 12/13/24)  12/23/2024 Pet/CT - CR.  Patient is here for follow up clinically doing well except for yellowish productive cough in the morning. Patient has no fever, SOB, or other symptoms and lungs care clear, advised to decongestant and humidifier for support care. To f/u with his PCP if symptoms worse or new symptoms arise.  Labs ordered, drawn in the office, reviewed, analyzed and discussed Now with hypercalcemia (Ca 10.6) with corrected calcium 9.6 - advised to follow low calcium food and repeat labs in 2 weeks with his PCP or with us.   -Dec 2024 reviewed with patient - no abnormality were seen on his left rib or abdomen that contributing to his discomfort in that area.  Advised to closely monitor and reach out to us if pain worsen.  Follow up in 3 months and restaging scan in 6 months  Uptake in the prostate Incidentally found on PET He denies any dysuria, polyuria, hematuria  ## Follicular lymphoma (1/8/2024) Grade 1-2 Found on inguinal lymph node biopsy --CT A/P (1/4/24): Splenomegaly. Numerous up to 3.4 cm low-density splenic lesions. Numerous osteolytic and sclerotic bone lesions compatible with metastasis. Mediastinal, retroperitoneal and mesenteric lymphadenopathy --MRI abdomen (1/2024): Enlarged spleen with multiple indeterminate lesions. Differential possibilities include splenic lymphoma versus metastases including melanoma.  Mild retroperitoneal lymphadenopathy. Indeterminate marrow replacing lesions in T10 and L2 vertebral bodies. Hepatitis B, hepatitis C, HIV panels negative --3/13/2024 bone marrow -monotypic b cells (0.43% of total, 4.2% of lymphocytes) in a background of polytpic B cells -myeloblasts (0.44%) ABNORMAL FISH LYMPHOMA PANEL - IGH::BCL2 fusion detected (3%)  #Pain  Referral to palliative care team and rad onc for pain management.   Patient had multiple questions which were answered to satisfaction  d/w Dr. Canela  Follow up in 3 months CBC, CMP, LDH

## 2025-01-24 NOTE — HISTORY OF PRESENT ILLNESS
[de-identified] : Ms. Fariba Meneses is 39 years old male with manubrium lesion here for hospital follow up  Patient with no reported PMHx, no daily medications, nonsmoker, no alcohol use, accompanied by wife/daughter, presents to [facility] ED on 1/4/2024 for evaluation of worsening chest pain for past month with acute worsening over past few days.   CTA A/P No evidence of aortic dissection or aortic aneurysm. 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 Bilateral lower lobe linear and patchy opacities suspicious for infiltrates.  Numerous osteolytic and sclerotic bone lesions compatible with metastasis of uncertain origin. These includes numerous osteolytic sternal and manubrial lesions breaking through the cortex . Recommend PET/CT.  Splenomegaly. Numerous up to 3.4 cm low-density splenic lesions. Follow-up MRI without without contrast is recommended. 3 cm cyst in hepatic segment 7 and subcentimeter low-density liver lesions too small to characterize. Prostatomegaly. Correlation with rectal exam and PSA level is recommended. Mediastinal, retroperitoneal and mesenteric lymphadenopathy. Correlation is recommended to exclude neoplastic etiologies.  1/5/2024 CT Chest LUNGS/AIRWAYS/PLEURA: Small volume secretions in the trachea. No endobronchial lesion. Linear and dependent atelectasis in the lower lobes. Left upper lobe calcified granuloma. No pleural effusion. LYMPH NODES/MEDIASTINUM: Unchanged mildly enlarged mediastinal lymph nodes in the prevascular, periaortic, and aortopulmonary window spaces. Calcified lymph nodes compatible with prior granulomatous disease. HEART/VASCULATURE: Normal heart size. No pericardial effusion. Normal caliber aorta. UPPER ABDOMEN: Hepatic cyst. Multiple hypoattenuating lesions in an enlarged spleen. BONES/SOFT TISSUES: Unchanged multiple lytic and sclerotic bone lesions.  1/7/2024 MRI Abd SPLEEN: Enlarged spanning 14.5 cm. Multiple splenic lesions with nonspecific imaging features, mildly T1 hyperintense, hypoenhancement to normal splenic parenchyma, largest 3.3 x 3.1 cm VESSELS: Right main renal artery saccular aneurysm 1.2 cm (19:35) RETROPERITONEUM/LYMPH NODES: Increased number of small retroperitoneal lymph nodes, some are enlarged, for example: *  Celiac axis 2.4 x 1.5 cm (4:18) *  Aortic bifurcation 2.3 x 1.4 cm (4:3) ABDOMINAL WALL: Within normal limits. BONES: 2 marrow replacing lesions in T10 vertebral body and 1 marrow replacing lesion in L2 vertebral body. IMPRESSION:  Enlarged spleen with multiple indeterminate lesions. Differential possibilities include splenic lymphoma versus metastases including melanoma. Recommend further workup with biopsy or FDG PET/CT. Mild retroperitoneal lymphadenopathy.  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     TB work up was neg Hepatitis neg  Flow cytometry: peripheral blood, flow cytometric immunophenotyping: A small lambda light chain restricted B-cell population is detected that represents approximately 2% of the total analyzed events. The significance of this finding is uncertain. It does not necessarily indicate involvement by a lymphoproliferative disorder.  1/8/2024  WBC 3.00 H/H 10.5/32.7 MCV 80.5    s/p hospitalized at [facility] from 8/12/2024 - 8/15/2024 for pain, hypercalcemia, and fatigue. Given Zometa and biopsy of left inguinal LN positive for B-cell lymphoma in a diffuse pattern    [de-identified] : Patient is here for follow up s/p C6 RCHOP (8/29/24 - 12/13/24)   Rosalie [id number]  He's been having flu like symptoms with yellowish productive cough and clear running nose in the last 2 weeks. Denies SOB, fever, chest pressure He has been having left side (rib/abdominal) pain that comes and goes. Currently asymptomatic.  Denies of any B symptoms.

## 2025-01-24 NOTE — REVIEW OF SYSTEMS
[Insomnia] : insomnia [FreeTextEntry2] : 10 point review of systems negative except as outlined in HPI [de-identified] : slight

## 2025-01-24 NOTE — HISTORY OF PRESENT ILLNESS
[de-identified] : Ms. Fariba Meneses is 39 years old male with manubrium lesion here for hospital follow up  Patient with no reported PMHx, no daily medications, nonsmoker, no alcohol use, accompanied by wife/daughter, presents to [facility] ED on 1/4/2024 for evaluation of worsening chest pain for past month with acute worsening over past few days.   CTA A/P No evidence of aortic dissection or aortic aneurysm. 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 Bilateral lower lobe linear and patchy opacities suspicious for infiltrates.  Numerous osteolytic and sclerotic bone lesions compatible with metastasis of uncertain origin. These includes numerous osteolytic sternal and manubrial lesions breaking through the cortex . Recommend PET/CT.  Splenomegaly. Numerous up to 3.4 cm low-density splenic lesions. Follow-up MRI without without contrast is recommended. 3 cm cyst in hepatic segment 7 and subcentimeter low-density liver lesions too small to characterize. Prostatomegaly. Correlation with rectal exam and PSA level is recommended. Mediastinal, retroperitoneal and mesenteric lymphadenopathy. Correlation is recommended to exclude neoplastic etiologies.  1/5/2024 CT Chest LUNGS/AIRWAYS/PLEURA: Small volume secretions in the trachea. No endobronchial lesion. Linear and dependent atelectasis in the lower lobes. Left upper lobe calcified granuloma. No pleural effusion. LYMPH NODES/MEDIASTINUM: Unchanged mildly enlarged mediastinal lymph nodes in the prevascular, periaortic, and aortopulmonary window spaces. Calcified lymph nodes compatible with prior granulomatous disease. HEART/VASCULATURE: Normal heart size. No pericardial effusion. Normal caliber aorta. UPPER ABDOMEN: Hepatic cyst. Multiple hypoattenuating lesions in an enlarged spleen. BONES/SOFT TISSUES: Unchanged multiple lytic and sclerotic bone lesions.  1/7/2024 MRI Abd SPLEEN: Enlarged spanning 14.5 cm. Multiple splenic lesions with nonspecific imaging features, mildly T1 hyperintense, hypoenhancement to normal splenic parenchyma, largest 3.3 x 3.1 cm VESSELS: Right main renal artery saccular aneurysm 1.2 cm (19:35) RETROPERITONEUM/LYMPH NODES: Increased number of small retroperitoneal lymph nodes, some are enlarged, for example: *  Celiac axis 2.4 x 1.5 cm (4:18) *  Aortic bifurcation 2.3 x 1.4 cm (4:3) ABDOMINAL WALL: Within normal limits. BONES: 2 marrow replacing lesions in T10 vertebral body and 1 marrow replacing lesion in L2 vertebral body. IMPRESSION:  Enlarged spleen with multiple indeterminate lesions. Differential possibilities include splenic lymphoma versus metastases including melanoma. Recommend further workup with biopsy or FDG PET/CT. Mild retroperitoneal lymphadenopathy.  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     TB work up was neg Hepatitis neg  Flow cytometry: peripheral blood, flow cytometric immunophenotyping: A small lambda light chain restricted B-cell population is detected that represents approximately 2% of the total analyzed events. The significance of this finding is uncertain. It does not necessarily indicate involvement by a lymphoproliferative disorder.  1/8/2024  WBC 3.00 H/H 10.5/32.7 MCV 80.5    s/p hospitalized at [facility] from 8/12/2024 - 8/15/2024 for pain, hypercalcemia, and fatigue. Given Zometa and biopsy of left inguinal LN positive for B-cell lymphoma in a diffuse pattern    [de-identified] : Patient is here for follow up s/p C6 RCHOP (8/29/24 - 12/13/24)   Rosalie [id number]  He's been having flu like symptoms with yellowish productive cough and clear running nose in the last 2 weeks. Denies SOB, fever, chest pressure He has been having left side (rib/abdominal) pain that comes and goes. Currently asymptomatic.  Denies of any B symptoms.

## 2025-04-25 NOTE — REVIEW OF SYSTEMS
[Insomnia] : insomnia [FreeTextEntry2] : 10 point review of systems negative except as outlined in HPI [de-identified] : slight

## 2025-04-25 NOTE — REVIEW OF SYSTEMS
[Insomnia] : insomnia [FreeTextEntry2] : 10 point review of systems negative except as outlined in HPI [de-identified] : slight Follow up with your primary care doctor.  Take ibuprofen (or Motrin) 600mg (3 tablets) up to 4 times per day as needed for pain with food or milk.   Return to this Emergency Department if you experience worsening condition or for any other emergency.

## 2025-04-25 NOTE — HISTORY OF PRESENT ILLNESS
[de-identified] : Ms. Fariba Meneses is 39 years old male with manubrium lesion here for hospital follow up  Patient with no reported PMHx, no daily medications, nonsmoker, no alcohol use, accompanied by wife/daughter, presents to [place] ED on 1/4/2024 for evaluation of worsening chest pain for past month with acute worsening over past few days.   CTA A/P No evidence of aortic dissection or aortic aneurysm. 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 Bilateral lower lobe linear and patchy opacities suspicious for infiltrates.  Numerous osteolytic and sclerotic bone lesions compatible with metastasis of uncertain origin. These includes numerous osteolytic sternal and manubrial lesions breaking through the cortex . Recommend PET/CT.  Splenomegaly. Numerous up to 3.4 cm low-density splenic lesions. Follow-up MRI without without contrast is recommended. 3 cm cyst in hepatic segment 7 and subcentimeter low-density liver lesions too small to characterize. Prostatomegaly. Correlation with rectal exam and PSA level is recommended. Mediastinal, retroperitoneal and mesenteric lymphadenopathy. Correlation is recommended to exclude neoplastic etiologies.  1/5/2024 CT Chest LUNGS/AIRWAYS/PLEURA: Small volume secretions in the trachea. No endobronchial lesion. Linear and dependent atelectasis in the lower lobes. Left upper lobe calcified granuloma. No pleural effusion. LYMPH NODES/MEDIASTINUM: Unchanged mildly enlarged mediastinal lymph nodes in the prevascular, periaortic, and aortopulmonary window spaces. Calcified lymph nodes compatible with prior granulomatous disease. HEART/VASCULATURE: Normal heart size. No pericardial effusion. Normal caliber aorta. UPPER ABDOMEN: Hepatic cyst. Multiple hypoattenuating lesions in an enlarged spleen. BONES/SOFT TISSUES: Unchanged multiple lytic and sclerotic bone lesions.  1/7/2024 MRI Abd SPLEEN: Enlarged spanning 14.5 cm. Multiple splenic lesions with nonspecific imaging features, mildly T1 hyperintense, hypoenhancement to normal splenic parenchyma, largest 3.3 x 3.1 cm VESSELS: Right main renal artery saccular aneurysm 1.2 cm (19:35) RETROPERITONEUM/LYMPH NODES: Increased number of small retroperitoneal lymph nodes, some are enlarged, for example: *  Celiac axis 2.4 x 1.5 cm (4:18) *  Aortic bifurcation 2.3 x 1.4 cm (4:3) ABDOMINAL WALL: Within normal limits. BONES: 2 marrow replacing lesions in T10 vertebral body and 1 marrow replacing lesion in L2 vertebral body. IMPRESSION:  Enlarged spleen with multiple indeterminate lesions. Differential possibilities include splenic lymphoma versus metastases including melanoma. Recommend further workup with biopsy or FDG PET/CT. Mild retroperitoneal lymphadenopathy.  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     TB work up was neg Hepatitis neg  Flow cytometry: peripheral blood, flow cytometric immunophenotyping: A small lambda light chain restricted B-cell population is detected that represents approximately 2% of the total analyzed events. The significance of this finding is uncertain. It does not necessarily indicate involvement by a lymphoproliferative disorder.  1/8/2024  WBC 3.00 H/H 10.5/32.7 MCV 80.5    s/p hospitalized at [facility] from 8/12/2024 - 8/15/2024 for pain, hypercalcemia, and fatigue. Given Zometa and biopsy of left inguinal LN positive for B-cell lymphoma in a diffuse pattern    [de-identified] : Patient is here for follow up s/p C6 TRACI (8/29/24 - 12/13/24)   Yessi # [id number]  He has intermittent generalized headaches and has itchy eyes, attributing his headaches 2/2 to not sleeping well despite taking all over the counter sleeping aid meds.  Denies of any B symptoms.  He has burning urination here and there quickly resolved. Denies any  symptoms.

## 2025-04-25 NOTE — ASSESSMENT
[FreeTextEntry1] :  ##DLBCL  ( dx - 8/2024) Patient with worsening left hip pain, finally had Pet/CT 7/31/2024 Pet/CT   1. Hypermetabolic lymphadenopathy above and below the diaphragm, numerous osseous mets, enlarged, heterogeneous spleen with numerous hypermetabolic lesions s/p Lopez for bone pain, hypercalcemia and fatigue - s/p Zometa 8/11/2024 and biopsy 8/12/2024 Left inguinal LN biopsy shows B cell lymphoma in a diffuse pattern  s/p mediport placement, TTE (EF 60-65%), and LP (neg)  11/12/2024 Pet/CT - CR  s/p C6 RCHOP (8/29/24 - 12/13/24)  12/23/2024 Pet/CT - CR.  Patient is here for follow up  Denies any B symptoms  Advised that he can take Benadryl qhs to help with sleep but if not improved, he can reach out to us or his PCP to start on Ambien.  Will do UA and culture for dysuria - advised to follow up with urology if symptoms persisted.  -Labs are drawn in the office, reviewed, analyzed, and discussed No palpable LNs To continue to monitor for now and repeat Pet/CT scan in June/July 2025.   Uptake in the prostate Incidentally found on PET to follow up with urologist   ## Follicular lymphoma (1/8/2024) Grade 1-2 Found on inguinal lymph node biopsy --CT A/P (1/4/24): Splenomegaly. Numerous up to 3.4 cm low-density splenic lesions. Numerous osteolytic and sclerotic bone lesions compatible with metastasis. Mediastinal, retroperitoneal and mesenteric lymphadenopathy --MRI abdomen (1/2024): Enlarged spleen with multiple indeterminate lesions. Differential possibilities include splenic lymphoma versus metastases including melanoma.  Mild retroperitoneal lymphadenopathy. Indeterminate marrow replacing lesions in T10 and L2 vertebral bodies. Hepatitis B, hepatitis C, HIV panels negative --3/13/2024 bone marrow -monotypic b cells (0.43% of total, 4.2% of lymphocytes) in a background of polytpic B cells -myeloblasts (0.44%) ABNORMAL FISH LYMPHOMA PANEL - IGH::BCL2 fusion detected (3%)  #Pain  Referral to palliative care team and rad onc for pain management.   Patient had multiple questions which were answered to satisfaction  d/w Dr. Canela  Follow up in 3 months CBC, CMP, LDH, PSA total and free

## 2025-04-25 NOTE — HISTORY OF PRESENT ILLNESS
[de-identified] : Ms. Fariba Meneses is 39 years old male with manubrium lesion here for hospital follow up  Patient with no reported PMHx, no daily medications, nonsmoker, no alcohol use, accompanied by wife/daughter, presents to [place] ED on 1/4/2024 for evaluation of worsening chest pain for past month with acute worsening over past few days.   CTA A/P No evidence of aortic dissection or aortic aneurysm. 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 Bilateral lower lobe linear and patchy opacities suspicious for infiltrates.  Numerous osteolytic and sclerotic bone lesions compatible with metastasis of uncertain origin. These includes numerous osteolytic sternal and manubrial lesions breaking through the cortex . Recommend PET/CT.  Splenomegaly. Numerous up to 3.4 cm low-density splenic lesions. Follow-up MRI without without contrast is recommended. 3 cm cyst in hepatic segment 7 and subcentimeter low-density liver lesions too small to characterize. Prostatomegaly. Correlation with rectal exam and PSA level is recommended. Mediastinal, retroperitoneal and mesenteric lymphadenopathy. Correlation is recommended to exclude neoplastic etiologies.  1/5/2024 CT Chest LUNGS/AIRWAYS/PLEURA: Small volume secretions in the trachea. No endobronchial lesion. Linear and dependent atelectasis in the lower lobes. Left upper lobe calcified granuloma. No pleural effusion. LYMPH NODES/MEDIASTINUM: Unchanged mildly enlarged mediastinal lymph nodes in the prevascular, periaortic, and aortopulmonary window spaces. Calcified lymph nodes compatible with prior granulomatous disease. HEART/VASCULATURE: Normal heart size. No pericardial effusion. Normal caliber aorta. UPPER ABDOMEN: Hepatic cyst. Multiple hypoattenuating lesions in an enlarged spleen. BONES/SOFT TISSUES: Unchanged multiple lytic and sclerotic bone lesions.  1/7/2024 MRI Abd SPLEEN: Enlarged spanning 14.5 cm. Multiple splenic lesions with nonspecific imaging features, mildly T1 hyperintense, hypoenhancement to normal splenic parenchyma, largest 3.3 x 3.1 cm VESSELS: Right main renal artery saccular aneurysm 1.2 cm (19:35) RETROPERITONEUM/LYMPH NODES: Increased number of small retroperitoneal lymph nodes, some are enlarged, for example: *  Celiac axis 2.4 x 1.5 cm (4:18) *  Aortic bifurcation 2.3 x 1.4 cm (4:3) ABDOMINAL WALL: Within normal limits. BONES: 2 marrow replacing lesions in T10 vertebral body and 1 marrow replacing lesion in L2 vertebral body. IMPRESSION:  Enlarged spleen with multiple indeterminate lesions. Differential possibilities include splenic lymphoma versus metastases including melanoma. Recommend further workup with biopsy or FDG PET/CT. Mild retroperitoneal lymphadenopathy.  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     TB work up was neg Hepatitis neg  Flow cytometry: peripheral blood, flow cytometric immunophenotyping: A small lambda light chain restricted B-cell population is detected that represents approximately 2% of the total analyzed events. The significance of this finding is uncertain. It does not necessarily indicate involvement by a lymphoproliferative disorder.  1/8/2024  WBC 3.00 H/H 10.5/32.7 MCV 80.5    s/p hospitalized at [facility] from 8/12/2024 - 8/15/2024 for pain, hypercalcemia, and fatigue. Given Zometa and biopsy of left inguinal LN positive for B-cell lymphoma in a diffuse pattern    [de-identified] : Patient is here for follow up s/p C6 TRACI (8/29/24 - 12/13/24)   Yessi # [id number]  He has intermittent generalized headaches and has itchy eyes, attributing his headaches 2/2 to not sleeping well despite taking all over the counter sleeping aid meds.  Denies of any B symptoms.  He has burning urination here and there quickly resolved. Denies any  symptoms.

## 2025-07-25 NOTE — REVIEW OF SYSTEMS
[Insomnia] : insomnia [FreeTextEntry2] : 10 point review of systems negative except as outlined in HPI [de-identified] : slight

## 2025-07-25 NOTE — ASSESSMENT
[FreeTextEntry1] : ##DLBCL  ( dx - 8/2024) Patient with worsening left hip pain, finally had Pet/CT 7/31/2024 Pet/CT   1. Hypermetabolic lymphadenopathy above and below the diaphragm, numerous osseous mets, enlarged, heterogeneous spleen with numerous hypermetabolic lesions s/p Lopez for bone pain, hypercalcemia and fatigue - s/p Zometa 8/11/2024 and biopsy 8/12/2024 Left inguinal LN biopsy shows B cell lymphoma in a diffuse pattern  s/p mediport placement, TTE (EF 60-65%), and LP (neg)  11/12/2024 Pet/CT - CR  s/p C6 RCHOP (8/29/24 - 12/13/24)  12/23/2024 Pet/CT - CR.  Patient is here for follow up  Denies any B symptoms  -Insomnia -  not improved with Melatonin. Will give script for Ambien 5 mg qhs.  To take Tylenol headaches.  Mediport flushed without any issues - normal examination  No palpable LNs 5/12/2025 Pet/CT scan - Minimally avid of lytic and sclerotic appearance on pelvis, sternum, and thoracic spine. unchanged hilar/subcarinal node. Nonspecific minimal heterogeneous activity in prostate.  --Scan reviewed and discussed  -Labs are drawn in the office, reviewed, analyzed, and discussed Will continue to monitor for now and repeat scan in Dec, prior to his next visit   ## Uptake in the prostate Incidentally found on PET Given referral to see urologist Dr. Lotus Barlow  ## Follicular lymphoma (1/8/2024) Grade 1-2 Found on inguinal lymph node biopsy --CT A/P (1/4/24): Splenomegaly. Numerous up to 3.4 cm low-density splenic lesions. Numerous osteolytic and sclerotic bone lesions compatible with metastasis. Mediastinal, retroperitoneal and mesenteric lymphadenopathy --MRI abdomen (1/2024): Enlarged spleen with multiple indeterminate lesions. Differential possibilities include splenic lymphoma versus metastases including melanoma.  Mild retroperitoneal lymphadenopathy. Indeterminate marrow replacing lesions in T10 and L2 vertebral bodies. Hepatitis B, hepatitis C, HIV panels negative --3/13/2024 bone marrow -monotypic b cells (0.43% of total, 4.2% of lymphocytes) in a background of polytpic B cells -myeloblasts (0.44%) ABNORMAL FISH LYMPHOMA PANEL - IGH::BCL2 fusion detected (3%)  #Pain  Referral to palliative care team and rad onc for pain management.   Patient had multiple questions which were answered to satisfaction  d/w Dr. Canela  Follow up in 3 months CBC, CMP, LDH, PSA total and free

## 2025-07-25 NOTE — HISTORY OF PRESENT ILLNESS
[de-identified] : Ms. Fariba Meneses is 39 years old male with manubrium lesion here for hospital follow up  Patient with no reported PMHx, no daily medications, nonsmoker, no alcohol use, accompanied by wife/daughter, presents to [place] ED on 1/4/2024 for evaluation of worsening chest pain for past month with acute worsening over past few days.   CTA A/P No evidence of aortic dissection or aortic aneurysm. 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 Bilateral lower lobe linear and patchy opacities suspicious for infiltrates.  Numerous osteolytic and sclerotic bone lesions compatible with metastasis of uncertain origin. These includes numerous osteolytic sternal and manubrial lesions breaking through the cortex . Recommend PET/CT.  Splenomegaly. Numerous up to 3.4 cm low-density splenic lesions. Follow-up MRI without without contrast is recommended. 3 cm cyst in hepatic segment 7 and subcentimeter low-density liver lesions too small to characterize. Prostatomegaly. Correlation with rectal exam and PSA level is recommended. Mediastinal, retroperitoneal and mesenteric lymphadenopathy. Correlation is recommended to exclude neoplastic etiologies.  1/5/2024 CT Chest LUNGS/AIRWAYS/PLEURA: Small volume secretions in the trachea. No endobronchial lesion. Linear and dependent atelectasis in the lower lobes. Left upper lobe calcified granuloma. No pleural effusion. LYMPH NODES/MEDIASTINUM: Unchanged mildly enlarged mediastinal lymph nodes in the prevascular, periaortic, and aortopulmonary window spaces. Calcified lymph nodes compatible with prior granulomatous disease. HEART/VASCULATURE: Normal heart size. No pericardial effusion. Normal caliber aorta. UPPER ABDOMEN: Hepatic cyst. Multiple hypoattenuating lesions in an enlarged spleen. BONES/SOFT TISSUES: Unchanged multiple lytic and sclerotic bone lesions.  1/7/2024 MRI Abd SPLEEN: Enlarged spanning 14.5 cm. Multiple splenic lesions with nonspecific imaging features, mildly T1 hyperintense, hypoenhancement to normal splenic parenchyma, largest 3.3 x 3.1 cm VESSELS: Right main renal artery saccular aneurysm 1.2 cm (19:35) RETROPERITONEUM/LYMPH NODES: Increased number of small retroperitoneal lymph nodes, some are enlarged, for example: *  Celiac axis 2.4 x 1.5 cm (4:18) *  Aortic bifurcation 2.3 x 1.4 cm (4:3) ABDOMINAL WALL: Within normal limits. BONES: 2 marrow replacing lesions in T10 vertebral body and 1 marrow replacing lesion in L2 vertebral body. IMPRESSION:  Enlarged spleen with multiple indeterminate lesions. Differential possibilities include splenic lymphoma versus metastases including melanoma. Recommend further workup with biopsy or FDG PET/CT. Mild retroperitoneal lymphadenopathy.  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     TB work up was neg Hepatitis neg  Flow cytometry: peripheral blood, flow cytometric immunophenotyping: A small lambda light chain restricted B-cell population is detected that represents approximately 2% of the total analyzed events. The significance of this finding is uncertain. It does not necessarily indicate involvement by a lymphoproliferative disorder.  1/8/2024  WBC 3.00 H/H 10.5/32.7 MCV 80.5    s/p hospitalized at [facility] from 8/12/2024 - 8/15/2024 for pain, hypercalcemia, and fatigue. Given Zometa and biopsy of left inguinal LN positive for B-cell lymphoma in a diffuse pattern    [de-identified] : Patient is here for follow up s/p C6 RCHOP (8/29/24 - 12/13/24)   Bruno # [id number] Accompanied by his mother   He's clinically well but reports of insomnia, intermittent headaches and itchiness on port here and there. No pain except for when port was flushed earlier today.  Denies of any B symptoms.   5/12/2025 Pet/CT   1. Resolution of diffuse marrow hypermetabolism. Minimally avid or nonavid heterogenous lytic and sclerotic appearance on pelvis, sternum, and thoracic spine . Deauville score 2.  2. Few minimally avid small bilateral hilar and subcarinal node, not significantly change from prior study, nonspecific.  3. Nonspecific minimal heterogeneous activity in prostate, similar to prior. Correlate clinically.

## 2025-07-25 NOTE — HISTORY OF PRESENT ILLNESS
[de-identified] : Ms. Fariba Meneses is 39 years old male with manubrium lesion here for hospital follow up  Patient with no reported PMHx, no daily medications, nonsmoker, no alcohol use, accompanied by wife/daughter, presents to [place] [facility] on 1/4/2024 for evaluation of worsening chest pain for past month with acute worsening over past few days.   CTA A/P No evidence of aortic dissection or aortic aneurysm. 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 Bilateral lower lobe linear and patchy opacities suspicious for infiltrates.  Numerous osteolytic and sclerotic bone lesions compatible with metastasis of uncertain origin. These includes numerous osteolytic sternal and manubrial lesions breaking through the cortex . Recommend PET/CT.  Splenomegaly. Numerous up to 3.4 cm low-density splenic lesions. Follow-up MRI without without contrast is recommended. 3 cm cyst in hepatic segment 7 and subcentimeter low-density liver lesions too small to characterize. Prostatomegaly. Correlation with rectal exam and PSA level is recommended. Mediastinal, retroperitoneal and mesenteric lymphadenopathy. Correlation is recommended to exclude neoplastic etiologies.  1/5/2024 CT Chest LUNGS/AIRWAYS/PLEURA: Small volume secretions in the trachea. No endobronchial lesion. Linear and dependent atelectasis in the lower lobes. Left upper lobe calcified granuloma. No pleural effusion. LYMPH NODES/MEDIASTINUM: Unchanged mildly enlarged mediastinal lymph nodes in the prevascular, periaortic, and aortopulmonary window spaces. Calcified lymph nodes compatible with prior granulomatous disease. HEART/VASCULATURE: Normal heart size. No pericardial effusion. Normal caliber aorta. UPPER ABDOMEN: Hepatic cyst. Multiple hypoattenuating lesions in an enlarged spleen. BONES/SOFT TISSUES: Unchanged multiple lytic and sclerotic bone lesions.  1/7/2024 MRI Abd SPLEEN: Enlarged spanning 14.5 cm. Multiple splenic lesions with nonspecific imaging features, mildly T1 hyperintense, hypoenhancement to normal splenic parenchyma, largest 3.3 x 3.1 cm VESSELS: Right main renal artery saccular aneurysm 1.2 cm (19:35) RETROPERITONEUM/LYMPH NODES: Increased number of small retroperitoneal lymph nodes, some are enlarged, for example: *  Celiac axis 2.4 x 1.5 cm (4:18) *  Aortic bifurcation 2.3 x 1.4 cm (4:3) ABDOMINAL WALL: Within normal limits. BONES: 2 marrow replacing lesions in T10 vertebral body and 1 marrow replacing lesion in L2 vertebral body. IMPRESSION:  Enlarged spleen with multiple indeterminate lesions. Differential possibilities include splenic lymphoma versus metastases including melanoma. Recommend further workup with biopsy or FDG PET/CT. Mild retroperitoneal lymphadenopathy.  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     TB work up was neg Hepatitis neg  Flow cytometry: peripheral blood, flow cytometric immunophenotyping: A small lambda light chain restricted B-cell population is detected that represents approximately 2% of the total analyzed events. The significance of this finding is uncertain. It does not necessarily indicate involvement by a lymphoproliferative disorder.  1/8/2024  WBC 3.00 H/H 10.5/32.7 MCV 80.5    s/p hospitalized at [facility] from 8/12/2024 - 8/15/2024 for pain, hypercalcemia, and fatigue. Given Zometa and biopsy of left inguinal LN positive for B-cell lymphoma in a diffuse pattern    [de-identified] : Patient is here for follow up s/p C6 [facility] (8/29/24 - 12/13/24)   Bruno # [id number] Accompanied by his mother   He's clinically well but reports of insomnia, intermittent headaches and itchiness on port here and there. No pain except for when port was flushed earlier today.  Denies of any B symptoms.   5/12/2025 Pet/CT   1. Resolution of diffuse marrow hypermetabolism. Minimally avid or nonavid heterogenous lytic and sclerotic appearance on pelvis, sternum, and thoracic spine . Deauville score 2.  2. Few minimally avid small bilateral hilar and subcarinal node, not significantly change from prior study, nonspecific.  3. Nonspecific minimal heterogeneous activity in prostate, similar to prior. Correlate clinically.

## 2025-07-25 NOTE — REVIEW OF SYSTEMS
[Insomnia] : insomnia [FreeTextEntry2] : 10 point review of systems negative except as outlined in HPI [de-identified] : slight